# Patient Record
Sex: FEMALE | Race: WHITE | NOT HISPANIC OR LATINO | URBAN - METROPOLITAN AREA
[De-identification: names, ages, dates, MRNs, and addresses within clinical notes are randomized per-mention and may not be internally consistent; named-entity substitution may affect disease eponyms.]

---

## 2017-07-18 ENCOUNTER — EMERGENCY (EMERGENCY)
Facility: HOSPITAL | Age: 57
LOS: 1 days | End: 2017-07-18
Payer: COMMERCIAL

## 2017-07-18 PROCEDURE — 73610 X-RAY EXAM OF ANKLE: CPT | Mod: 26,RT

## 2017-07-18 PROCEDURE — 99283 EMERGENCY DEPT VISIT LOW MDM: CPT | Mod: 25

## 2017-07-18 PROCEDURE — 29515 APPLICATION SHORT LEG SPLINT: CPT

## 2017-07-18 PROCEDURE — 73630 X-RAY EXAM OF FOOT: CPT | Mod: 26,RT

## 2017-08-17 ENCOUNTER — OUTPATIENT (OUTPATIENT)
Dept: OUTPATIENT SERVICES | Facility: HOSPITAL | Age: 57
LOS: 1 days | End: 2017-08-17
Payer: COMMERCIAL

## 2017-08-17 PROCEDURE — 74230 X-RAY XM SWLNG FUNCJ C+: CPT | Mod: 26

## 2017-08-19 ENCOUNTER — EMERGENCY (EMERGENCY)
Facility: HOSPITAL | Age: 57
LOS: 1 days | End: 2017-08-19
Payer: COMMERCIAL

## 2017-08-19 PROCEDURE — 99284 EMERGENCY DEPT VISIT MOD MDM: CPT

## 2017-08-19 PROCEDURE — 71020: CPT | Mod: 26

## 2017-09-11 ENCOUNTER — EMERGENCY (EMERGENCY)
Facility: HOSPITAL | Age: 57
LOS: 1 days | End: 2017-09-11
Payer: COMMERCIAL

## 2017-09-11 PROCEDURE — 74020: CPT | Mod: 26

## 2017-09-11 PROCEDURE — 99285 EMERGENCY DEPT VISIT HI MDM: CPT | Mod: 25

## 2017-09-11 PROCEDURE — 99284 EMERGENCY DEPT VISIT MOD MDM: CPT

## 2017-09-12 PROCEDURE — 74177 CT ABD & PELVIS W/CONTRAST: CPT | Mod: 26

## 2017-09-12 PROCEDURE — 72125 CT NECK SPINE W/O DYE: CPT | Mod: 26

## 2017-10-01 ENCOUNTER — EMERGENCY (EMERGENCY)
Facility: HOSPITAL | Age: 57
LOS: 1 days | End: 2017-10-01
Payer: COMMERCIAL

## 2017-10-01 PROCEDURE — 71020: CPT | Mod: 26

## 2017-10-01 PROCEDURE — 99284 EMERGENCY DEPT VISIT MOD MDM: CPT

## 2017-10-01 PROCEDURE — 76705 ECHO EXAM OF ABDOMEN: CPT | Mod: 26

## 2018-08-15 ENCOUNTER — OUTPATIENT (OUTPATIENT)
Dept: OUTPATIENT SERVICES | Facility: HOSPITAL | Age: 58
LOS: 1 days | End: 2018-08-15

## 2019-04-25 PROBLEM — Z00.00 ENCOUNTER FOR PREVENTIVE HEALTH EXAMINATION: Status: ACTIVE | Noted: 2019-04-25

## 2019-04-27 ENCOUNTER — APPOINTMENT (OUTPATIENT)
Dept: MAMMOGRAPHY | Facility: CLINIC | Age: 59
End: 2019-04-27
Payer: COMMERCIAL

## 2019-04-27 PROCEDURE — 77063 BREAST TOMOSYNTHESIS BI: CPT

## 2019-04-27 PROCEDURE — 77067 SCR MAMMO BI INCL CAD: CPT

## 2019-05-13 ENCOUNTER — APPOINTMENT (OUTPATIENT)
Dept: RADIOLOGY | Facility: CLINIC | Age: 59
End: 2019-05-13
Payer: COMMERCIAL

## 2019-05-13 PROCEDURE — 77080 DXA BONE DENSITY AXIAL: CPT

## 2019-10-09 ENCOUNTER — APPOINTMENT (OUTPATIENT)
Dept: RADIOLOGY | Facility: CLINIC | Age: 59
End: 2019-10-09
Payer: COMMERCIAL

## 2019-10-09 PROCEDURE — 74220 X-RAY XM ESOPHAGUS 1CNTRST: CPT

## 2020-02-18 ENCOUNTER — APPOINTMENT (OUTPATIENT)
Dept: CT IMAGING | Facility: CLINIC | Age: 60
End: 2020-02-18
Payer: COMMERCIAL

## 2020-02-18 PROCEDURE — 74178 CT ABD&PLV WO CNTR FLWD CNTR: CPT

## 2020-02-18 PROCEDURE — Q9967B: CUSTOM

## 2020-07-07 ENCOUNTER — APPOINTMENT (OUTPATIENT)
Dept: ULTRASOUND IMAGING | Facility: CLINIC | Age: 60
End: 2020-07-07
Payer: COMMERCIAL

## 2020-07-07 PROCEDURE — 76536 US EXAM OF HEAD AND NECK: CPT

## 2020-08-03 ENCOUNTER — OUTPATIENT (OUTPATIENT)
Dept: OUTPATIENT SERVICES | Facility: HOSPITAL | Age: 60
LOS: 1 days | End: 2020-08-03

## 2020-08-03 ENCOUNTER — EMERGENCY (EMERGENCY)
Facility: HOSPITAL | Age: 60
LOS: 1 days | End: 2020-08-03
Admitting: INTERNAL MEDICINE
Payer: COMMERCIAL

## 2020-08-03 PROCEDURE — 71045 X-RAY EXAM CHEST 1 VIEW: CPT | Mod: 26

## 2020-08-03 PROCEDURE — 93971 EXTREMITY STUDY: CPT | Mod: 26,LT

## 2020-08-03 PROCEDURE — 99285 EMERGENCY DEPT VISIT HI MDM: CPT

## 2020-08-04 ENCOUNTER — OUTPATIENT (OUTPATIENT)
Dept: OUTPATIENT SERVICES | Facility: HOSPITAL | Age: 60
LOS: 1 days | End: 2020-08-04
Payer: COMMERCIAL

## 2020-08-04 PROCEDURE — 99284 EMERGENCY DEPT VISIT MOD MDM: CPT

## 2020-08-12 ENCOUNTER — APPOINTMENT (OUTPATIENT)
Dept: MAMMOGRAPHY | Facility: CLINIC | Age: 60
End: 2020-08-12
Payer: COMMERCIAL

## 2020-08-12 PROCEDURE — 77063 BREAST TOMOSYNTHESIS BI: CPT

## 2020-08-12 PROCEDURE — 77067 SCR MAMMO BI INCL CAD: CPT

## 2020-08-13 ENCOUNTER — APPOINTMENT (OUTPATIENT)
Dept: CARDIOLOGY | Facility: CLINIC | Age: 60
End: 2020-08-13
Payer: COMMERCIAL

## 2020-08-13 VITALS
BODY MASS INDEX: 37.76 KG/M2 | OXYGEN SATURATION: 97 % | TEMPERATURE: 97.4 F | SYSTOLIC BLOOD PRESSURE: 126 MMHG | DIASTOLIC BLOOD PRESSURE: 82 MMHG | HEIGHT: 61 IN | WEIGHT: 200 LBS | HEART RATE: 87 BPM

## 2020-08-13 DIAGNOSIS — I10 ESSENTIAL (PRIMARY) HYPERTENSION: ICD-10-CM

## 2020-08-13 DIAGNOSIS — Z86.39 PERSONAL HISTORY OF OTHER ENDOCRINE, NUTRITIONAL AND METABOLIC DISEASE: ICD-10-CM

## 2020-08-13 DIAGNOSIS — Z87.19 PERSONAL HISTORY OF OTHER DISEASES OF THE DIGESTIVE SYSTEM: ICD-10-CM

## 2020-08-13 DIAGNOSIS — Z82.49 FAMILY HISTORY OF ISCHEMIC HEART DISEASE AND OTHER DISEASES OF THE CIRCULATORY SYSTEM: ICD-10-CM

## 2020-08-13 DIAGNOSIS — Z87.09 PERSONAL HISTORY OF OTHER DISEASES OF THE RESPIRATORY SYSTEM: ICD-10-CM

## 2020-08-13 DIAGNOSIS — Z78.9 OTHER SPECIFIED HEALTH STATUS: ICD-10-CM

## 2020-08-13 DIAGNOSIS — U07.1 COVID-19: ICD-10-CM

## 2020-08-13 DIAGNOSIS — Z82.5 FAMILY HISTORY OF ASTHMA AND OTHER CHRONIC LOWER RESPIRATORY DISEASES: ICD-10-CM

## 2020-08-13 PROCEDURE — 99203 OFFICE O/P NEW LOW 30 MIN: CPT

## 2020-08-13 NOTE — REASON FOR VISIT
[Initial Evaluation] : an initial evaluation of [Hyperlipidemia] : hyperlipidemia [Hypertension] : hypertension [Medication Management] : Medication management

## 2020-08-13 NOTE — REVIEW OF SYSTEMS
[see HPI] : see HPI [Negative] : Heme/Lymph [Shortness Of Breath] : no shortness of breath [Dyspnea on exertion] : not dyspnea during exertion [Lower Ext Edema] : no extremity edema [Chest Pain] : no chest pain [Palpitations] : no palpitations

## 2020-08-13 NOTE — HISTORY OF PRESENT ILLNESS
[FreeTextEntry1] : 59 year old female with history of HLD presents for an evaluation [place] [facility] [place] urgency. Patient states that she normally has BP that runs within normal limits. Over the past few months she has had a lot of stress. Her and her  contracted COVID-19. They were sick but did not require hospitalization. After this her  required a stent and valve surgery. Her son is also autistic so she has been busy with him. She developed high blood pressure that she noticed while taking her BP with her 's machine. Went to PCP office who sent her to the hospital. Patient had some left arm pain but she states this was secondary to a shingles vaccine she received. She has no exertional chest pain, SOB or palpitations. She denies a previous history of MI, CVA or coronary intervention. She was discharged from hospital on amlodipine 5mg daily and HCTZ 12.5mg daily. She continues to monitor BP at home and it ranges from 110-130/70-80.

## 2020-08-13 NOTE — PHYSICAL EXAM
[General Appearance - Well Developed] : well developed [Well Groomed] : well groomed [Normal Appearance] : normal appearance [General Appearance - In No Acute Distress] : no acute distress [General Appearance - Well Nourished] : well nourished [Eyelids - No Xanthelasma] : the eyelids demonstrated no xanthelasmas [Normal Conjunctiva] : the conjunctiva exhibited no abnormalities [No Oral Cyanosis] : no oral cyanosis [Normal Oral Mucosa] : normal oral mucosa [Normal Oropharynx] : normal oropharynx [Heart Rate And Rhythm] : heart rate and rhythm were normal [Heart Sounds] : normal S1 and S2 [Edema] : no peripheral edema present [Murmurs] : no murmurs present [Respiration, Rhythm And Depth] : normal respiratory rhythm and effort [Exaggerated Use Of Accessory Muscles For Inspiration] : no accessory muscle use [Auscultation Breath Sounds / Voice Sounds] : lungs were clear to auscultation bilaterally [Abnormal Walk] : normal gait [Gait - Sufficient For Exercise Testing] : the gait was sufficient for exercise testing [Nail Clubbing] : no clubbing of the fingernails [Cyanosis, Localized] : no localized cyanosis [Skin Color & Pigmentation] : normal skin color and pigmentation [] : no rash [Skin Turgor] : normal skin turgor [Impaired Insight] : insight and judgment were intact [Affect] : the affect was normal [Memory Recent] : recent memory was not impaired [FreeTextEntry1] : No JVD, no carotid artery bruits auscultated bilaterally

## 2020-08-13 NOTE — DISCUSSION/SUMMARY
[FreeTextEntry1] : 1. Hypertension: appears controlled. Continue amlodipine 5mg daily and HCTZ 12.5mg daily. Recommend echocardiogram and ETT.\par \par 2. HLD: continue rosuvastatin 10mg daily. \par \par Office will call with results.\par \par Follow up in 1 year.

## 2020-10-12 ENCOUNTER — APPOINTMENT (OUTPATIENT)
Dept: CARDIOLOGY | Facility: CLINIC | Age: 60
End: 2020-10-12
Payer: COMMERCIAL

## 2020-10-12 PROCEDURE — 93306 TTE W/DOPPLER COMPLETE: CPT

## 2020-10-12 PROCEDURE — 93015 CV STRESS TEST SUPVJ I&R: CPT

## 2021-01-15 ENCOUNTER — APPOINTMENT (OUTPATIENT)
Dept: RADIOLOGY | Facility: CLINIC | Age: 61
End: 2021-01-15
Payer: COMMERCIAL

## 2021-01-15 PROCEDURE — 73130 X-RAY EXAM OF HAND: CPT | Mod: RT

## 2021-01-16 ENCOUNTER — TRANSCRIPTION ENCOUNTER (OUTPATIENT)
Age: 61
End: 2021-01-16

## 2021-08-18 ENCOUNTER — APPOINTMENT (OUTPATIENT)
Dept: CARDIOLOGY | Facility: CLINIC | Age: 61
End: 2021-08-18
Payer: COMMERCIAL

## 2021-08-18 ENCOUNTER — NON-APPOINTMENT (OUTPATIENT)
Age: 61
End: 2021-08-18

## 2021-08-18 VITALS
SYSTOLIC BLOOD PRESSURE: 118 MMHG | HEIGHT: 61 IN | BODY MASS INDEX: 35.68 KG/M2 | TEMPERATURE: 97.7 F | HEART RATE: 63 BPM | DIASTOLIC BLOOD PRESSURE: 66 MMHG | WEIGHT: 189 LBS | OXYGEN SATURATION: 97 %

## 2021-08-18 PROCEDURE — 93000 ELECTROCARDIOGRAM COMPLETE: CPT

## 2021-08-18 PROCEDURE — 99214 OFFICE O/P EST MOD 30 MIN: CPT | Mod: 25

## 2021-08-18 RX ORDER — AMLODIPINE BESYLATE 5 MG/1
5 TABLET ORAL DAILY
Qty: 90 | Refills: 3 | Status: DISCONTINUED | COMMUNITY
End: 2021-08-18

## 2021-08-18 NOTE — PHYSICAL EXAM
[Normal] : moves all extremities, no focal deficits, normal speech [de-identified] : No JVD, no carotid artery bruits auscultated bilaterally

## 2021-08-18 NOTE — HISTORY OF PRESENT ILLNESS
[FreeTextEntry1] : Historical Perspective:\par 59 year old female with history of HLD presents for an evaluation [place] [facility] [place] urgency. Patient states that she normally has BP that runs within normal limits. Over the past few months she has had a lot of stress. Her and her  contracted COVID-19. They were sick but did not require hospitalization. After this her  required a stent and valve surgery. Her son is also autistic so she has been busy with him. She developed high blood pressure that she noticed while taking her BP with her 's machine. Went to PCP office who sent her to the hospital. Patient had some left arm pain but she states this was secondary to a shingles vaccine she received. She has no exertional chest pain, SOB or palpitations. She denies a previous history of MI, CVA or coronary intervention. She was discharged from hospital on amlodipine 5mg daily and HCTZ 12.5mg daily. She continues to monitor BP at home and it ranges from 110-130/70-80. \par \par Current Health Status:\par Patient with no chest pain, SOB, or palpitations. No hospitalizations since seeing me last. Remains compliant with his medications and reports no adverse effects. Patient had her rosuvastatin lowered from 10mg-->5mg daily. Patient had her amlodipine discontinued because her BP improved but remains on HCTZ.\par

## 2021-08-18 NOTE — CARDIOLOGY SUMMARY
[de-identified] : 08/18/2021, NSR [de-identified] : 10/12/2020, Plain Treadmill Stress Test: Exercised for 7 minutes and 30 seconds utilizing Standard Bruno Protocol. No chest pain or abnormal dyspnea. No ECG changes to suggest ischemia.\par  [de-identified] : 10/12/2020, LV EF 60%, mild MR, trace-mild TR with estimated PASP of 26mmHg.

## 2021-08-18 NOTE — DISCUSSION/SUMMARY
[FreeTextEntry1] : 1. Hypertension: appears controlled. Continue  HCTZ 12.5mg daily. Goal BP less than 130/80.\par \par 2. HLD: continue rosuvastatin 5mg daily. \par \par Follow up in 1 year. \par

## 2021-11-17 ENCOUNTER — APPOINTMENT (OUTPATIENT)
Dept: RADIOLOGY | Facility: CLINIC | Age: 61
End: 2021-11-17

## 2021-11-17 ENCOUNTER — APPOINTMENT (OUTPATIENT)
Dept: MAMMOGRAPHY | Facility: CLINIC | Age: 61
End: 2021-11-17

## 2021-12-21 ENCOUNTER — APPOINTMENT (OUTPATIENT)
Dept: ULTRASOUND IMAGING | Facility: CLINIC | Age: 61
End: 2021-12-21

## 2021-12-21 ENCOUNTER — APPOINTMENT (OUTPATIENT)
Dept: RADIOLOGY | Facility: CLINIC | Age: 61
End: 2021-12-21
Payer: COMMERCIAL

## 2021-12-21 ENCOUNTER — APPOINTMENT (OUTPATIENT)
Dept: MAMMOGRAPHY | Facility: CLINIC | Age: 61
End: 2021-12-21

## 2021-12-21 PROCEDURE — 77063 BREAST TOMOSYNTHESIS BI: CPT

## 2021-12-21 PROCEDURE — 77067 SCR MAMMO BI INCL CAD: CPT

## 2021-12-21 PROCEDURE — 77080 DXA BONE DENSITY AXIAL: CPT

## 2022-05-06 ENCOUNTER — APPOINTMENT (OUTPATIENT)
Dept: ORTHOPEDIC SURGERY | Facility: CLINIC | Age: 62
End: 2022-05-06
Payer: COMMERCIAL

## 2022-05-06 VITALS — WEIGHT: 187 LBS | BODY MASS INDEX: 35.3 KG/M2 | HEIGHT: 61 IN

## 2022-05-06 DIAGNOSIS — E78.00 PURE HYPERCHOLESTEROLEMIA, UNSPECIFIED: ICD-10-CM

## 2022-05-06 DIAGNOSIS — Z78.9 OTHER SPECIFIED HEALTH STATUS: ICD-10-CM

## 2022-05-06 DIAGNOSIS — M67.449 GANGLION, UNSPECIFIED HAND: ICD-10-CM

## 2022-05-06 PROCEDURE — 99203 OFFICE O/P NEW LOW 30 MIN: CPT

## 2022-05-07 PROBLEM — M67.449 MUCOUS CYST OF FINGER: Status: ACTIVE | Noted: 2022-05-07

## 2022-05-07 NOTE — ASSESSMENT
[FreeTextEntry1] : Right SF mucous cyst - reviewed pathoanatomy with patient. Discussed management will consist of observation and finger sleeve. Discussed potential for operative excision.\par \par F/u prn

## 2022-05-07 NOTE — HISTORY OF PRESENT ILLNESS
[de-identified] : 61F, RHD, Medication for acid reflux and potassium. In January-February developed a cist in right hand pinky.Experiences right hand pain. Pinky finger shows swelling. No numbness/tingling. When its squeezed or banged pain feels sharp. Went to a dermatologist in [facility]. They drained it and claimed to notice a cist. Biopsy was completed and admitted to have no initial findings.

## 2022-05-07 NOTE — IMAGING
[de-identified] : Right SF with mucous cyst at radial DIP joint. No erythema. Able to flex and extend at MCP, PIP and DIP. Sensation intact throughout. <2sec cap refill.

## 2022-07-22 ENCOUNTER — NON-APPOINTMENT (OUTPATIENT)
Age: 62
End: 2022-07-22

## 2022-07-22 ENCOUNTER — APPOINTMENT (OUTPATIENT)
Dept: CARDIOLOGY | Facility: CLINIC | Age: 62
End: 2022-07-22

## 2022-07-22 VITALS
WEIGHT: 200 LBS | DIASTOLIC BLOOD PRESSURE: 90 MMHG | BODY MASS INDEX: 37.76 KG/M2 | HEART RATE: 88 BPM | SYSTOLIC BLOOD PRESSURE: 150 MMHG | OXYGEN SATURATION: 98 % | HEIGHT: 61 IN

## 2022-07-22 PROCEDURE — 93000 ELECTROCARDIOGRAM COMPLETE: CPT

## 2022-07-22 PROCEDURE — 99214 OFFICE O/P EST MOD 30 MIN: CPT | Mod: 25

## 2022-07-22 RX ORDER — ROSUVASTATIN CALCIUM 5 MG/1
5 TABLET, FILM COATED ORAL DAILY
Refills: 0 | Status: DISCONTINUED | COMMUNITY
End: 2022-07-22

## 2022-07-22 RX ORDER — PANTOPRAZOLE SODIUM 40 MG/1
40 GRANULE, DELAYED RELEASE ORAL
Refills: 0 | Status: DISCONTINUED | COMMUNITY
End: 2022-07-22

## 2022-07-22 RX ORDER — HYDROCHLOROTHIAZIDE 12.5 MG/1
12.5 CAPSULE ORAL DAILY
Qty: 90 | Refills: 3 | Status: DISCONTINUED | COMMUNITY
End: 2022-07-22

## 2022-07-22 RX ORDER — PANTOPRAZOLE SODIUM 40 MG/10ML
40 INJECTION, POWDER, FOR SOLUTION INTRAVENOUS
Refills: 0 | Status: DISCONTINUED | COMMUNITY
End: 2022-07-22

## 2022-07-22 NOTE — CARDIOLOGY SUMMARY
[de-identified] : 7/22/2022, NSR, normal ECG [de-identified] : 10/12/2020, Plain Treadmill Stress Test: Exercised for 7 minutes and 30 seconds utilizing Standard Bruno Protocol. No chest pain or abnormal dyspnea. No ECG changes to suggest ischemia.\par  [de-identified] : 10/12/2020, LV EF 60%, mild MR, trace-mild TR with estimated PASP of 26mmHg.

## 2022-07-22 NOTE — HISTORY OF PRESENT ILLNESS
[FreeTextEntry1] : Historical Perspective:\par 61 year old female with history of HLD presents for an evaluation [place] [facility] [place] urgency. Patient states that she normally has BP that runs within normal limits. Over the past few months she has had a lot of stress. Her and her  contracted COVID-19. They were sick but did not require hospitalization. After this her  required a stent and valve surgery. Her son is also autistic so she has been busy with him. She developed high blood pressure that she noticed while taking her BP with her 's machine. Went to PCP office who sent her to the hospital. Patient had some left arm pain but she states this was secondary to a shingles vaccine she received. She has no exertional chest pain, SOB or palpitations. She denies a previous history of MI, CVA or coronary intervention. She was discharged from hospital on amlodipine 5mg daily and HCTZ 12.5mg daily. She continues to monitor BP at home and it ranges from 110-130/70-80. \par \par Current Health Status:\par Since seeing me last patient have elevated BP readings. Patient was taken off HCTZ because of hypokalemia. Recently started on losartan 50mg daily and BP remaining high. Patient also under a lot of stress at home with marital issues. No chest pain, dyspnea, or palpitations. \par

## 2022-07-22 NOTE — PHYSICAL EXAM
[Normal] : moves all extremities, no focal deficits, normal speech [de-identified] : No carotid artery bruits auscultated bilaterally

## 2022-07-22 NOTE — DISCUSSION/SUMMARY
[FreeTextEntry1] : 1. Hypertension: appears uncontrolled. Patient was taken off HCTZ because of hypokalemia. Recently started on losartan 50mg daily and BP remaining high. Recommend adding Labetalol 100mg BID. Recommend low salt diet. Goal BP less than 130/80.\par \par 2. HLD: previously on rosuvastatin 5mg daily. Off now. Will readdress at next OV.\par \par Follow up in 1 month.\par

## 2022-08-25 ENCOUNTER — APPOINTMENT (OUTPATIENT)
Dept: CARDIOLOGY | Facility: CLINIC | Age: 62
End: 2022-08-25

## 2022-08-25 VITALS
HEIGHT: 61 IN | BODY MASS INDEX: 37.76 KG/M2 | WEIGHT: 200 LBS | SYSTOLIC BLOOD PRESSURE: 152 MMHG | DIASTOLIC BLOOD PRESSURE: 76 MMHG | HEART RATE: 66 BPM | OXYGEN SATURATION: 97 %

## 2022-08-25 PROCEDURE — 99214 OFFICE O/P EST MOD 30 MIN: CPT

## 2022-08-25 RX ORDER — POTASSIUM CHLORIDE 1500 MG/1
20 TABLET, EXTENDED RELEASE ORAL
Qty: 90 | Refills: 0 | Status: DISCONTINUED | COMMUNITY
Start: 2022-05-14 | End: 2022-08-25

## 2022-08-25 NOTE — HISTORY OF PRESENT ILLNESS
[FreeTextEntry1] : Historical Perspective:\par 61 year old female with history of HLD presents for an evaluation after being [place] [facility] [place] with hypertensive urgency. Patient states that she normally has BP that runs within normal limits. Over the past few months she has had a lot of stress. Her and her  contracted COVID-19. They were sick but did not require hospitalization. After this her  required a stent and valve surgery. Her son is also autistic so she has been busy with him. She developed high blood pressure that she noticed while taking her BP with her 's machine. Went to PCP office who sent her to the hospital. Patient had some left arm pain but she states this was secondary to a shingles vaccine she received. She has no exertional chest pain, SOB or palpitations. She denies a previous history of MI, CVA or coronary intervention. She was discharged from hospital on amlodipine 5mg daily and HCTZ 12.5mg daily. She continues to monitor BP at home and it ranges from 110-130/70-80. \par \par Current Health Status:\par Since seeing me last patient have elevated BP readings. Patient was taken off HCTZ because of hypokalemia. Recently increased losartan to 100mg daily and BP remaining high. Patient also under a lot of stress at home with marital issues. No chest pain, dyspnea, or palpitations. \par

## 2022-08-25 NOTE — DISCUSSION/SUMMARY
[FreeTextEntry1] : 1. Hypertension: appears uncontrolled. Patient was taken off HCTZ because of hypokalemia. Recently increased losartan to 100mg daily and BP remaining high. Recommend increasing Labetalol to 200mg BID. Recommend low salt diet. Goal BP less than 130/80.\par \par 2. HLD: previously on rosuvastatin 5mg daily. Off now. Will readdress at next OV.\par \par Follow up in 6 month.\par

## 2022-08-25 NOTE — CARDIOLOGY SUMMARY
[de-identified] : 7/22/2022, NSR, normal ECG [de-identified] : 10/12/2020, Plain Treadmill Stress Test: Exercised for 7 minutes and 30 seconds utilizing Standard Bruno Protocol. No chest pain or abnormal dyspnea. No ECG changes to suggest ischemia.\par  [de-identified] : 10/12/2020, LV EF 60%, mild MR, trace-mild TR with estimated PASP of 26mmHg.

## 2022-08-25 NOTE — PHYSICAL EXAM
[Normal] : moves all extremities, no focal deficits, normal speech [de-identified] : No carotid artery bruits auscultated bilaterally

## 2022-12-03 ENCOUNTER — APPOINTMENT (OUTPATIENT)
Dept: MAMMOGRAPHY | Facility: CLINIC | Age: 62
End: 2022-12-03

## 2022-12-03 PROCEDURE — 77063 BREAST TOMOSYNTHESIS BI: CPT

## 2022-12-03 PROCEDURE — 77067 SCR MAMMO BI INCL CAD: CPT

## 2023-03-09 ENCOUNTER — NON-APPOINTMENT (OUTPATIENT)
Age: 63
End: 2023-03-09

## 2023-03-09 ENCOUNTER — APPOINTMENT (OUTPATIENT)
Dept: CARDIOLOGY | Facility: CLINIC | Age: 63
End: 2023-03-09
Payer: COMMERCIAL

## 2023-03-09 VITALS
OXYGEN SATURATION: 99 % | SYSTOLIC BLOOD PRESSURE: 120 MMHG | HEART RATE: 79 BPM | DIASTOLIC BLOOD PRESSURE: 64 MMHG | BODY MASS INDEX: 33.23 KG/M2 | HEIGHT: 61 IN | WEIGHT: 176 LBS

## 2023-03-09 PROCEDURE — 99215 OFFICE O/P EST HI 40 MIN: CPT | Mod: 25

## 2023-03-09 PROCEDURE — 93000 ELECTROCARDIOGRAM COMPLETE: CPT

## 2023-03-09 RX ORDER — LABETALOL HYDROCHLORIDE 100 MG/1
100 TABLET, FILM COATED ORAL DAILY
Qty: 90 | Refills: 0 | Status: ACTIVE | COMMUNITY
Start: 2022-07-22 | End: 1900-01-01

## 2023-03-09 NOTE — HISTORY OF PRESENT ILLNESS
[FreeTextEntry1] : Historical Perspective:\par 61 year old female with history of HLD presents for an evaluation after [place] [facility] [place] with hypertensive urgency. Patient states that she normally has BP that runs within normal limits. Over the past few months she has had a lot of stress. Her and her  contracted COVID-19. They were sick but did not require hospitalization. After this her  required a stent and valve surgery. Her son is also autistic so she has been busy with him. She developed high blood pressure that she noticed while taking her BP with her 's machine. Went to PCP office who sent her to the hospital. Patient had some left arm pain but she states this was secondary to a shingles vaccine she received. She has no exertional chest pain, SOB or palpitations. She denies a previous history of MI, CVA or coronary intervention. She was discharged from hospital on amlodipine 5mg daily and HCTZ 12.5mg daily. She continues to monitor BP at home and it ranges from 110-130/70-80. \par \par Current Health Status:\par Patient with no chest pain, SOB, or palpitations. No hospitalizations since seeing me last. Remains compliant with his medications and reports no adverse effects.\par \par

## 2023-03-09 NOTE — DISCUSSION/SUMMARY
[FreeTextEntry1] : 1. Hypertension: appears controlled. Patient was taken off HCTZ because of hypokalemia. Continue losartan 50mg daily and Labetalol 100mg nightly (high risk medication with no signs of toxicity). Recommend low salt diet. Goal BP less than 130/80.\par \par 2. HLD: previously on rosuvastatin 5mg daily. Off now because as per patient PCP stopped it because her cholesterol was good. She should follow up regarding her lipid panel with PCP.\par \par 3. Family Hx of aortic aneurysm: will order abdominal aorta duplex to screen for AAA.\par \par Follow up in 12 months.\par  [EKG obtained to assist in diagnosis and management of assessed problem(s)] : EKG obtained to assist in diagnosis and management of assessed problem(s)

## 2023-03-09 NOTE — CARDIOLOGY SUMMARY
[de-identified] : 3/9/2023, NSR, normal ECG [de-identified] : 10/12/2020, Plain Treadmill Stress Test: Exercised for 7 minutes and 30 seconds utilizing Standard Bruno Protocol. No chest pain or abnormal dyspnea. No ECG changes to suggest ischemia.\par  [de-identified] : 10/12/2020, LV EF 60%, mild MR, trace-mild TR with estimated PASP of 26mmHg.

## 2023-04-06 ENCOUNTER — APPOINTMENT (OUTPATIENT)
Dept: CARDIOLOGY | Facility: CLINIC | Age: 63
End: 2023-04-06
Payer: COMMERCIAL

## 2023-04-06 PROCEDURE — 93979 VASCULAR STUDY: CPT

## 2023-04-10 ENCOUNTER — NON-APPOINTMENT (OUTPATIENT)
Age: 63
End: 2023-04-10

## 2023-08-10 ENCOUNTER — APPOINTMENT (OUTPATIENT)
Dept: RADIOLOGY | Facility: CLINIC | Age: 63
End: 2023-08-10
Payer: COMMERCIAL

## 2023-08-10 PROCEDURE — 73060 X-RAY EXAM OF HUMERUS: CPT | Mod: LT

## 2023-08-10 PROCEDURE — 73030 X-RAY EXAM OF SHOULDER: CPT | Mod: LT

## 2024-01-29 ENCOUNTER — APPOINTMENT (OUTPATIENT)
Dept: MAMMOGRAPHY | Facility: CLINIC | Age: 64
End: 2024-01-29
Payer: COMMERCIAL

## 2024-01-29 ENCOUNTER — APPOINTMENT (OUTPATIENT)
Dept: RADIOLOGY | Facility: CLINIC | Age: 64
End: 2024-01-29

## 2024-01-29 PROCEDURE — 77067 SCR MAMMO BI INCL CAD: CPT

## 2024-01-29 PROCEDURE — 77080 DXA BONE DENSITY AXIAL: CPT

## 2024-01-29 PROCEDURE — 77063 BREAST TOMOSYNTHESIS BI: CPT

## 2024-04-04 ENCOUNTER — APPOINTMENT (OUTPATIENT)
Dept: RADIOLOGY | Facility: CLINIC | Age: 64
End: 2024-04-04
Payer: COMMERCIAL

## 2024-04-04 PROCEDURE — 73110 X-RAY EXAM OF WRIST: CPT | Mod: 50

## 2024-04-04 PROCEDURE — 73130 X-RAY EXAM OF HAND: CPT | Mod: 50

## 2024-05-30 ENCOUNTER — APPOINTMENT (OUTPATIENT)
Dept: CARDIOLOGY | Facility: CLINIC | Age: 64
End: 2024-05-30
Payer: COMMERCIAL

## 2024-05-30 ENCOUNTER — NON-APPOINTMENT (OUTPATIENT)
Age: 64
End: 2024-05-30

## 2024-05-30 VITALS
HEIGHT: 61 IN | WEIGHT: 190 LBS | BODY MASS INDEX: 35.87 KG/M2 | OXYGEN SATURATION: 97 % | SYSTOLIC BLOOD PRESSURE: 110 MMHG | DIASTOLIC BLOOD PRESSURE: 64 MMHG | HEART RATE: 64 BPM

## 2024-05-30 DIAGNOSIS — Z13.6 ENCOUNTER FOR SCREENING FOR CARDIOVASCULAR DISORDERS: ICD-10-CM

## 2024-05-30 DIAGNOSIS — I10 ESSENTIAL (PRIMARY) HYPERTENSION: ICD-10-CM

## 2024-05-30 DIAGNOSIS — E78.00 PURE HYPERCHOLESTEROLEMIA, UNSPECIFIED: ICD-10-CM

## 2024-05-30 DIAGNOSIS — Z79.899 OTHER LONG TERM (CURRENT) DRUG THERAPY: ICD-10-CM

## 2024-05-30 PROCEDURE — G2211 COMPLEX E/M VISIT ADD ON: CPT

## 2024-05-30 PROCEDURE — 99215 OFFICE O/P EST HI 40 MIN: CPT

## 2024-05-30 PROCEDURE — 93000 ELECTROCARDIOGRAM COMPLETE: CPT

## 2024-05-30 RX ORDER — ROSUVASTATIN CALCIUM 10 MG/1
10 TABLET, FILM COATED ORAL DAILY
Refills: 0 | Status: ACTIVE | COMMUNITY

## 2024-05-30 RX ORDER — LOSARTAN POTASSIUM 50 MG/1
50 TABLET, FILM COATED ORAL DAILY
Refills: 0 | Status: ACTIVE | COMMUNITY

## 2024-05-30 RX ORDER — PANTOPRAZOLE 40 MG/1
40 TABLET, DELAYED RELEASE ORAL
Qty: 60 | Refills: 0 | Status: DISCONTINUED | COMMUNITY
Start: 2022-02-11 | End: 2024-05-30

## 2024-05-30 NOTE — DISCUSSION/SUMMARY
[FreeTextEntry1] : 1. Hypertension: appears controlled. Patient was taken off HCTZ because of hypokalemia. Continue losartan 50mg daily and Labetalol 100mg nightly (high risk medication with no signs of toxicity). Recommend low salt diet. Goal BP less than 130/80.  2. HLD: continue rosuvastatin 10mg daiy  3. Family Hx of aortic aneurysm: no AAA on 4/2023 abdominal aorta duplex.  Follow up in 12 months.  [EKG obtained to assist in diagnosis and management of assessed problem(s)] : EKG obtained to assist in diagnosis and management of assessed problem(s)

## 2024-05-30 NOTE — HISTORY OF PRESENT ILLNESS
[FreeTextEntry1] : Historical Perspective: 63 year old female with history of HLD presents for an evaluation [place] [facility] [place] with hypertensive urgency. Patient states that she normally has BP that runs within normal limits. Over the past few months she has had a lot of stress. Her and her  contracted COVID-19. They were sick but did not require hospitalization. After this her  required a stent and valve surgery. Her son is also autistic so she has been busy with him. She developed high blood pressure that she noticed while taking her BP with her 's machine. Went to PCP office who sent her to the hospital. Patient had some left arm pain but she states this was secondary to a shingles vaccine she received. She has no exertional chest pain, SOB or palpitations. She denies a previous history of MI, CVA or coronary intervention. She was discharged from hospital on amlodipine 5mg daily and HCTZ 12.5mg daily. She continues to monitor BP at home and it ranges from 110-130/70-80.   Current Health Status: Patient with no chest pain, SOB, or palpitations. No hospitalizations since seeing me last. Remains compliant with his medications and reports no adverse effects.

## 2024-05-30 NOTE — CARDIOLOGY SUMMARY
Caller would like to discuss an/a Return call. Writer advised caller of callback within 24-72 hours.    Patient Name: Pernell Conklin  Caller Name: self  Name of Facility: n/a  [facility] Response: N/A  Callback Number: [phone]  Best Availability: Anytime   Can A Detailed Message Be left? yes  Fax Number: n/a  Additional Info: Patient is asking for a return call to confirm that she can take hydrochlorothiazide. Patient states that she believes Dr. Adrian prescribed the medication but she is not sure and would like to check with PCP. Writer unable to further assist.   Did you confirm the message with the caller?: yes    Thank you,  Cammie Lopez     [de-identified] : 5/30/2024, NSR, normal ECG 3/9/2023, NSR, normal ECG [de-identified] : 10/12/2020, Plain Treadmill Stress Test: Exercised for 7 minutes and 30 seconds utilizing Standard Bruno Protocol. No chest pain or abnormal dyspnea. No ECG changes to suggest ischemia.\par   [de-identified] : 10/12/2020, LV EF 60%, mild MR, trace-mild TR with estimated PASP of 26mmHg. [de-identified] : 4/6/2023, Abdominal Aortic Duplex: no AAA

## 2024-05-30 NOTE — PHYSICAL EXAM
[Normal] : moves all extremities, no focal deficits, normal speech [de-identified] : No carotid artery bruits auscultated bilaterally

## 2025-05-22 ENCOUNTER — NON-APPOINTMENT (OUTPATIENT)
Age: 65
End: 2025-05-22

## 2025-05-22 ENCOUNTER — APPOINTMENT (OUTPATIENT)
Dept: CARDIOLOGY | Facility: CLINIC | Age: 65
End: 2025-05-22
Payer: COMMERCIAL

## 2025-05-22 VITALS
WEIGHT: 180 LBS | HEART RATE: 72 BPM | DIASTOLIC BLOOD PRESSURE: 66 MMHG | OXYGEN SATURATION: 98 % | SYSTOLIC BLOOD PRESSURE: 106 MMHG | BODY MASS INDEX: 33.99 KG/M2 | HEIGHT: 61 IN

## 2025-05-22 DIAGNOSIS — Z13.6 ENCOUNTER FOR SCREENING FOR CARDIOVASCULAR DISORDERS: ICD-10-CM

## 2025-05-22 DIAGNOSIS — I10 ESSENTIAL (PRIMARY) HYPERTENSION: ICD-10-CM

## 2025-05-22 DIAGNOSIS — E78.00 PURE HYPERCHOLESTEROLEMIA, UNSPECIFIED: ICD-10-CM

## 2025-05-22 PROCEDURE — G2211 COMPLEX E/M VISIT ADD ON: CPT | Mod: NC

## 2025-05-22 PROCEDURE — 99214 OFFICE O/P EST MOD 30 MIN: CPT

## 2025-05-22 PROCEDURE — 93000 ELECTROCARDIOGRAM COMPLETE: CPT

## 2025-05-22 RX ORDER — TIRZEPATIDE 5 MG/.5ML
5 INJECTION, SOLUTION SUBCUTANEOUS
Refills: 0 | Status: ACTIVE | COMMUNITY